# Patient Record
Sex: FEMALE | Race: WHITE | NOT HISPANIC OR LATINO | Employment: UNEMPLOYED
[De-identification: names, ages, dates, MRNs, and addresses within clinical notes are randomized per-mention and may not be internally consistent; named-entity substitution may affect disease eponyms.]

---

## 2021-10-25 ENCOUNTER — OFFICE VISIT (OUTPATIENT)
Dept: MEDICAL GROUP | Facility: CLINIC | Age: 46
End: 2021-10-25
Payer: MEDICAID

## 2021-10-25 VITALS
BODY MASS INDEX: 28.29 KG/M2 | SYSTOLIC BLOOD PRESSURE: 110 MMHG | RESPIRATION RATE: 12 BRPM | HEIGHT: 69 IN | DIASTOLIC BLOOD PRESSURE: 68 MMHG | WEIGHT: 191 LBS | HEART RATE: 78 BPM

## 2021-10-25 DIAGNOSIS — Z12.11 SCREENING FOR COLON CANCER: ICD-10-CM

## 2021-10-25 DIAGNOSIS — F51.01 PRIMARY INSOMNIA: ICD-10-CM

## 2021-10-25 DIAGNOSIS — Z13.9 ENCOUNTER FOR HEALTH-RELATED SCREENING: ICD-10-CM

## 2021-10-25 DIAGNOSIS — R00.2 PALPITATIONS: ICD-10-CM

## 2021-10-25 PROBLEM — J45.909 ASTHMA: Status: RESOLVED | Noted: 2021-10-25 | Resolved: 2021-10-25

## 2021-10-25 PROBLEM — R60.0 EDEMA OF LOWER EXTREMITY: Status: ACTIVE | Noted: 2017-04-20

## 2021-10-25 PROBLEM — J45.909 ASTHMA: Status: ACTIVE | Noted: 2021-10-25

## 2021-10-25 PROBLEM — G47.00 INSOMNIA: Status: ACTIVE | Noted: 2017-05-16

## 2021-10-25 PROCEDURE — 99214 OFFICE O/P EST MOD 30 MIN: CPT | Mod: GC | Performed by: STUDENT IN AN ORGANIZED HEALTH CARE EDUCATION/TRAINING PROGRAM

## 2021-10-25 RX ORDER — ZOLPIDEM TARTRATE 10 MG/1
10 TABLET ORAL
Qty: 90 TABLET | Refills: 0 | Status: SHIPPED | OUTPATIENT
Start: 2021-10-25 | End: 2022-01-23

## 2021-10-25 RX ORDER — METHOCARBAMOL 750 MG/1
1 TABLET, FILM COATED ORAL
COMMUNITY
Start: 2021-09-20 | End: 2021-11-02 | Stop reason: SDUPTHER

## 2021-10-25 RX ORDER — ZOLPIDEM TARTRATE 10 MG/1
1 TABLET ORAL
COMMUNITY
Start: 2021-09-21 | End: 2021-10-25 | Stop reason: SDUPTHER

## 2021-10-26 NOTE — PROGRESS NOTES
"[facility]    Chief Complaint   Patient presents with   • Medication Refill     ambien, robaxine celine       HISTORY OF PRESENT ILLNESS: Patient is a 46 y.o. female established patient who presents today to discuss the medical issues below:    Problem   Screening for Colon Cancer    Patient has not had previous colon cancer screening.  Denies abdominal complaints or hematochezia.  Denies family history of colon cancer.     Palpitations    Patient reports multiple episodes of palpitations and mild chest discomfort over the past few weeks.  Patient states that she will go from \"tachycardia to bradycardia\" in a matter of moments.  She has not complained of dizziness, nausea, crushing chest pain, dyspnea, or difficulty breathing during these episodes.    Of note, patient states that she was hospitalized a few years ago was apparently diagnosed with CHF and placed on metoprolol.  This was discontinued at some point, and she has not seen a cardiologist in many years.       Encounter for Health-Related Screening    Patient states that she has not had routine labs done in many years now.  She states that she would like to repeat labs for health maintenance screening.     Insomnia    Patient reports over 15 years of chronic insomnia, states that she has been on Ambien for the past 15 years with adequate relief of symptoms.  Patient states that she has tried multiple other regimens in the past, including melatonin, trazodone, and Modification of sleep hygiene.  She also also trialed lower doses of Ambien in the past without adequate relief.  She is not interested in CBT at this time.     Edema of Lower Extremity   Asthma (Resolved)        Patient Active Problem List    Diagnosis Date Noted   • Asthma 10/25/2021   • Screening for colon cancer 10/25/2021   • Palpitations 10/25/2021   • Insomnia 05/16/2017   • Edema of lower extremity 04/20/2017       Allergies:Patient has no allergy information on record.    Current " "Outpatient Medications   Medication Sig Dispense Refill   • methocarbamol (ROBAXIN) 750 MG Tab Take 1 Tablet by mouth 2 times a day.     • zolpidem (AMBIEN) 10 MG Tab Take 1 Tablet by mouth at bedtime as needed for up to 90 days. 90 Tablet 0     No current facility-administered medications for this visit.         No past medical history on file.    Social History     Tobacco Use   • Smoking status: Not on file   Substance Use Topics   • Alcohol use: Not on file   • Drug use: Not on file       No family status information on file.   No family history on file.    ROS:  Negative except as stated above.      Exam:   /68   Pulse 78   Resp 12   Ht 1.753 m (5' 9\")   Wt 86.6 kg (191 lb)  Body mass index is 28.21 kg/m².  General:  Well nourished, well developed female in NAD.  HENT: Normocephalic  Pulmonary: Clear to ausculation.  Normal effort. No rales, rhonchi, or wheezing.  Cardiovascular: Regular rate and rhythm without murmur.   Abdomen: Normal bowel sounds, soft and nontender, no palpable liver, spleen, or masses.  Extremities: No LE edema noted. 5/5 strength in all extremities  Neuro: Grossly nonfocal.  Psych: Alert and oriented to person, place, and time. Appropriate mood and conversation.            Assessment/Plan:    Insomnia  Per patient report, she is doing well on her current regimen and performing adequate sleep hygiene.  Discussed with patient that she may need to decrease in the near future in order to avoid complications of chronic use, and she agreed to this.  -Refilled for 3 months  -Return to clinic in 1 month to reassess and discuss possible decrease.    Palpitations  EKG performed in clinic reassuring.  -Unclear cardiac history, requested the patient sign a release of information form for [facility] so that we may further identify the possible history of CHF and/or other cardiac history.  -Consider echocardiogram after review of records  -Consider Zio patch after review " of records    Screening for colon cancer  Based on current USPSTF guidelines, patient is now at the appropriate age for colon cancer screening.  She opts for colonoscopy at this time.    Encounter for health-related screening  Ordered BMP, CBC, A1c, lipid profile, and TSH.  -Return to clinic in about 1 month to review results and make changes as needed.             Tj Long, DO  [facility]  PGY-2

## 2021-10-26 NOTE — ASSESSMENT & PLAN NOTE
EKG performed in clinic reassuring.  -Unclear cardiac history, requested the patient sign a release of information form for [facility] so that we may further identify the possible history of CHF and/or other cardiac history.  -Consider echocardiogram after review of records  -Consider Zio patch after review of records

## 2021-10-26 NOTE — ASSESSMENT & PLAN NOTE
Ordered BMP, CBC, A1c, lipid profile, and TSH.  -Return to clinic in about 1 month to review results and make changes as needed.

## 2021-10-26 NOTE — ASSESSMENT & PLAN NOTE
Per patient report, she is doing well on her current regimen and performing adequate sleep hygiene.  Discussed with patient that she may need to decrease in the near future in order to avoid complications of chronic use, and she agreed to this.  -Refilled for 3 months  -Return to clinic in 1 month to reassess and discuss possible decrease.

## 2021-10-26 NOTE — ASSESSMENT & PLAN NOTE
Based on current USPSTF guidelines, patient is now at the appropriate age for colon cancer screening.  She opts for colonoscopy at this time.

## 2021-11-02 RX ORDER — METHOCARBAMOL 750 MG/1
750 TABLET, FILM COATED ORAL
Qty: 120 TABLET | Refills: 0 | Status: SHIPPED | OUTPATIENT
Start: 2021-11-02 | End: 2022-03-09 | Stop reason: SDUPTHER

## 2021-11-02 NOTE — TELEPHONE ENCOUNTER
Received request via: Patient    Was the patient seen in the last year in this department? Yes    Does the patient have an active prescription (recently filled or refills available) for medication(s) requested? No     Patients called and asking about the refills.Accoring to CB her we send this meds on 09/20/2021 for a 90 days supply.

## 2022-02-14 ENCOUNTER — TELEPHONE (OUTPATIENT)
Dept: MEDICAL GROUP | Facility: CLINIC | Age: 47
End: 2022-02-14
Payer: MEDICAID

## 2022-02-14 NOTE — TELEPHONE ENCOUNTER
Caller Name: Lashon Ricketts  Call Back Number: [phone] (home)       Patient called with concerns about her medications. She states that she has been taking her robaxin 3x daily.. and it is written as 2x daily. She also states she is taking Ambien 10mg, nightly. I do not see that medication on her list. I did let her know she will need to be seen about the Ambien and we probably will not be able to have that prescribed to her prior to her appointment, she has an appointment in March..

## 2022-03-01 ENCOUNTER — APPOINTMENT (OUTPATIENT)
Dept: MEDICAL GROUP | Facility: CLINIC | Age: 47
End: 2022-03-01
Payer: MEDICAID

## 2022-03-09 ENCOUNTER — OFFICE VISIT (OUTPATIENT)
Dept: MEDICAL GROUP | Facility: CLINIC | Age: 47
End: 2022-03-09
Payer: MEDICAID

## 2022-03-09 VITALS
OXYGEN SATURATION: 95 % | HEIGHT: 69 IN | BODY MASS INDEX: 25.82 KG/M2 | WEIGHT: 174.3 LBS | HEART RATE: 85 BPM | RESPIRATION RATE: 16 BRPM | DIASTOLIC BLOOD PRESSURE: 82 MMHG | SYSTOLIC BLOOD PRESSURE: 134 MMHG | TEMPERATURE: 97.7 F

## 2022-03-09 DIAGNOSIS — M54.42 CHRONIC BILATERAL LOW BACK PAIN WITH BILATERAL SCIATICA: ICD-10-CM

## 2022-03-09 DIAGNOSIS — G89.29 CHRONIC BILATERAL LOW BACK PAIN WITH BILATERAL SCIATICA: ICD-10-CM

## 2022-03-09 DIAGNOSIS — M54.31 BILATERAL SCIATICA: ICD-10-CM

## 2022-03-09 DIAGNOSIS — M54.41 CHRONIC BILATERAL LOW BACK PAIN WITH BILATERAL SCIATICA: ICD-10-CM

## 2022-03-09 DIAGNOSIS — M54.32 BILATERAL SCIATICA: ICD-10-CM

## 2022-03-09 PROBLEM — M54.9 BACK PAIN: Status: ACTIVE | Noted: 2017-04-20

## 2022-03-09 PROBLEM — M54.30 SCIATICA: Status: ACTIVE | Noted: 2022-03-09

## 2022-03-09 PROCEDURE — 99213 OFFICE O/P EST LOW 20 MIN: CPT | Mod: GE

## 2022-03-09 RX ORDER — ZOLPIDEM TARTRATE 10 MG/1
10 TABLET ORAL
COMMUNITY
Start: 2022-02-17 | End: 2022-04-12 | Stop reason: SDUPTHER

## 2022-03-09 RX ORDER — METHOCARBAMOL 750 MG/1
750 TABLET, FILM COATED ORAL 3 TIMES DAILY
Qty: 90 TABLET | Refills: 3 | Status: SHIPPED | OUTPATIENT
Start: 2022-03-09 | End: 2022-06-22 | Stop reason: SDUPTHER

## 2022-03-09 ASSESSMENT — PATIENT HEALTH QUESTIONNAIRE - PHQ9: CLINICAL INTERPRETATION OF PHQ2 SCORE: 0

## 2022-03-10 NOTE — PROGRESS NOTES
[facility]     PATIENT ID:  NAME:  Lashon Ricketts  MRN:               [id number]  YOB: 1975    Date: 2:43 PM      Resident: Domo Constantino MD     CC:  Chronic back pain    HPI: Lashon Ricketts is a 46 y.o. female who presented to clinic stating that since her medication frequency was changed she has developed worsening chronic back pain.  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  She states that since that time she has had multiple imaging studies that it showed chronic degenerative changes and she has experienced chronic lower back pain and sciatica.  Patient states that for the past 19 years she has been taking Robaxin 750 mg usually 3 times per day.  She states that she has been seen at the [facility] for a long time and occasionally when her physicians graduate and she is assigned a new physician her medications will be changed and she develops worsening of her chronic low back pain.  She denies any recent falls or trauma.  She reports no additional complaints or concerns at this time.    REVIEW OF SYSTEMS:   Ten systems reviewed and were negative except as noted in the HPI.                PROBLEM LIST  Patient Active Problem List   Diagnosis   • Insomnia   • Edema of lower extremity   • Screening for colon cancer   • Palpitations   • Encounter for health-related screening   • Chronic back pain   • Sciatica        PAST SURGICAL HISTORY:  Past Surgical History:   Procedure Laterality Date   • GASTRIC BYPASS LAPAROSCOPIC  10/12/2000       FAMILY HISTORY:  History reviewed. No pertinent family history.    SOCIAL HISTORY:   Social History     Tobacco Use   • Smoking status: Not on file   • Smokeless tobacco: Not on file   Substance Use Topics   • Alcohol use: Not on file       ALLERGIES:  No Known Allergies    OUTPATIENT MEDICATIONS:    Current Outpatient Medications:   •  " zolpidem (AMBIEN) 10 MG Tab, Take 10 mg by mouth at bedtime as needed., Disp: , Rfl:   •  methocarbamol (ROBAXIN) 750 MG Tab, Take 1 Tablet by mouth 3 times a day for 120 days., Disp: 90 Tablet, Rfl: 3    PHYSICAL EXAM:  Vitals:    03/09/22 1552   BP: 134/82   BP Location: Right arm   Patient Position: Sitting   BP Cuff Size: Adult   Pulse: 85   Resp: 16   Temp: 36.5 °C (97.7 °F)   TempSrc: Temporal   SpO2: 95%   Weight: 79.1 kg (174 lb 4.8 oz)   Height: 1.753 m (5' 9\")       General: Pt resting in NAD, pleasant and cooperative   Skin:  Pink, warm and dry.  HEENT: NC/AT. EOMI. PERRLA, neck supple, no lymphadenopathy  Lungs:  Symmetrical.  CTAB, good air movement, no respiratory distress  Cardiovascular:  S1/S2 RRR, no murmurs rubs or gallops, warm and well-perfused  Abdomen:  Abdomen is soft, nontender  Extremities:  Full range of motion.  Back: Mild spasm to the left lower paraspinal muscles with mild tenderness to palpation, no midline tenderness or spinous process tenderness, no step-off  CNS:  Muscle tone is normal. No gross focal neurologic deficits      ASSESSMENT/PLAN:   46 y.o. female who presents to the clinic complaining of worsening chronic back pain since her Robaxin 750 mg prescription was reduced from 3 times daily to 2 times daily.  Have advised the patient that this is not a great long-term medication for pain control.  Advised her that it will be necessary to decrease this medication in the future but at this time I will restart the Robaxin 750 mg at 3 times daily but will also encourage that the patient participates in physical therapy until she is able to be seen by spinal surgery for surgical options. The patient states she has been seen by spinal surgery in the past and has been advised that surgery would be an option for her, she states that she would like to pursue this but is concerned about possible adverse effects from the surgery. Will have the patient return to the clinic after a month " of physical therapy to evaluate for any improvement and discuss if robaxin medication can be titrated down.     Problem List Items Addressed This Visit     Chronic back pain    Relevant Medications    methocarbamol (ROBAXIN) 750 MG Tab    Other Relevant Orders    Referral to Physical Therapy    Referral to Spine Surgery    Sciatica    Relevant Medications    zolpidem (AMBIEN) 10 MG Tab    methocarbamol (ROBAXIN) 750 MG Tab    Other Relevant Orders    Referral to Physical Therapy    Referral to Spine Surgery          Domo Constantino MD   PGY-1  [facility]

## 2022-04-08 ENCOUNTER — TELEPHONE (OUTPATIENT)
Dept: MEDICAL GROUP | Facility: CLINIC | Age: 47
End: 2022-04-08
Payer: MEDICAID

## 2022-04-08 NOTE — TELEPHONE ENCOUNTER
Phone Number Called: [phone] (home)     Call outcome: Left detailed message for patient. Informed to call back with any additional questions.    Message: Called patient and left a voicemail letting her know to schedule an appointment with a provider if she wants her controlled substance (zolpidem) refilled.

## 2022-04-11 DIAGNOSIS — G47.00 INSOMNIA, UNSPECIFIED TYPE: ICD-10-CM

## 2022-04-11 NOTE — TELEPHONE ENCOUNTER
Lashon came to the office today and was asking for her Ambien. She said you were supposed to send it in. It looks like it was never e-prescribed.

## 2022-04-12 DIAGNOSIS — G47.00 INSOMNIA, UNSPECIFIED TYPE: ICD-10-CM

## 2022-04-12 RX ORDER — ZOLPIDEM TARTRATE 10 MG/1
10 TABLET ORAL
Qty: 30 TABLET | Refills: 3 | Status: CANCELLED | OUTPATIENT
Start: 2022-04-12 | End: 2022-05-12

## 2022-04-12 RX ORDER — ZOLPIDEM TARTRATE 10 MG/1
10 TABLET ORAL
Qty: 30 TABLET | Refills: 1 | Status: SHIPPED | OUTPATIENT
Start: 2022-04-12 | End: 2022-06-22 | Stop reason: SDUPTHER

## 2022-04-12 RX ORDER — ZOLPIDEM TARTRATE 10 MG/1
10 TABLET ORAL
Qty: 30 TABLET | Status: CANCELLED | OUTPATIENT
Start: 2022-04-12

## 2022-04-12 NOTE — TELEPHONE ENCOUNTER
Patient called to follow-up on this. She says she was supposed to get her Ambien refilled at her last appointment but it was never sent in. Confirmed pharmacy on file, thank you!

## 2022-06-14 ENCOUNTER — APPOINTMENT (OUTPATIENT)
Dept: PHYSICAL THERAPY | Facility: REHABILITATION | Age: 47
End: 2022-06-14
Payer: MEDICAID

## 2022-06-16 ENCOUNTER — APPOINTMENT (OUTPATIENT)
Dept: PHYSICAL THERAPY | Facility: REHABILITATION | Age: 47
End: 2022-06-16
Payer: MEDICAID

## 2022-06-21 ENCOUNTER — APPOINTMENT (OUTPATIENT)
Dept: PHYSICAL THERAPY | Facility: REHABILITATION | Age: 47
End: 2022-06-21
Payer: MEDICAID

## 2022-06-22 ENCOUNTER — OFFICE VISIT (OUTPATIENT)
Dept: MEDICAL GROUP | Facility: CLINIC | Age: 47
End: 2022-06-22
Payer: MEDICAID

## 2022-06-22 VITALS
DIASTOLIC BLOOD PRESSURE: 82 MMHG | RESPIRATION RATE: 16 BRPM | WEIGHT: 159 LBS | SYSTOLIC BLOOD PRESSURE: 124 MMHG | OXYGEN SATURATION: 97 % | HEIGHT: 69 IN | HEART RATE: 77 BPM | BODY MASS INDEX: 23.55 KG/M2

## 2022-06-22 DIAGNOSIS — G89.29 CHRONIC BILATERAL LOW BACK PAIN WITHOUT SCIATICA: ICD-10-CM

## 2022-06-22 DIAGNOSIS — M54.50 CHRONIC BILATERAL LOW BACK PAIN WITHOUT SCIATICA: ICD-10-CM

## 2022-06-22 DIAGNOSIS — G47.00 INSOMNIA, UNSPECIFIED TYPE: ICD-10-CM

## 2022-06-22 PROCEDURE — 99213 OFFICE O/P EST LOW 20 MIN: CPT | Mod: GE

## 2022-06-22 RX ORDER — ZOLPIDEM TARTRATE 10 MG/1
10 TABLET ORAL
Qty: 30 TABLET | Refills: 2 | Status: SHIPPED | OUTPATIENT
Start: 2022-06-22 | End: 2022-07-22

## 2022-06-22 RX ORDER — METHOCARBAMOL 750 MG/1
750 TABLET, FILM COATED ORAL 3 TIMES DAILY
Qty: 90 TABLET | Refills: 3 | Status: SHIPPED | OUTPATIENT
Start: 2022-06-22 | End: 2022-10-20

## 2022-06-22 NOTE — PROGRESS NOTES
[facility]     PATIENT ID:  NAME:  Lashon Ricketts  MRN:               [id number]  YOB: 1975    Date: 2:22 PM      Resident: Domo Constantino MD     CC: Med refill     HPI: Lashon Ricketts is a 46 y.o. female who presents to clinic for medication refill.  Patient has a longstanding history of chronic lower back pain.  She states that she has not been able to get in with physical therapy as of this time but does have an appointment in the near future.  Patient also states that she has been in contact with the spinal surgical clinic and is planning to make an appointment there soon as she feels she is ready to broach the subject of surgery at this time.  Patient has been medicating daily with Robaxin 3 times daily and states that her pain is well controlled.  She also notes that she has been taking Ambien nightly and this is necessary for her sleep.  She denies any other complaints or concerns at this time and states that she was in her normal state of health.  She denies any recent trauma or falls.      REVIEW OF SYSTEMS:   Ten systems reviewed and were negative except as noted in the HPI.                PROBLEM LIST  Patient Active Problem List   Diagnosis   • Insomnia   • Edema of lower extremity   • Screening for colon cancer   • Palpitations   • Encounter for health-related screening   • Chronic back pain   • Sciatica        PAST SURGICAL HISTORY:  Past Surgical History:   Procedure Laterality Date   • GASTRIC BYPASS LAPAROSCOPIC  10/12/2000       FAMILY HISTORY:  History reviewed. No pertinent family history.    SOCIAL HISTORY:   Social History     Tobacco Use   • Smoking status: Never Smoker   • Smokeless tobacco: Never Used   Substance Use Topics   • Alcohol use: Not on file       ALLERGIES:  No Known Allergies    OUTPATIENT MEDICATIONS:    Current Outpatient Medications:   •  methocarbamol (ROBAXIN) 750 MG Tab, Take 1 Tablet by mouth 3 times a day for 120 days., Disp: 90 Tablet, Rfl:  "3  •  zolpidem (AMBIEN) 10 MG Tab, Take 1 Tablet by mouth at bedtime as needed for Sleep for up to 30 days., Disp: 30 Tablet, Rfl: 2    PHYSICAL EXAM:  Vitals:    06/22/22 0811   BP: 124/82   BP Location: Left arm   Patient Position: Sitting   BP Cuff Size: Adult   Pulse: 77   Resp: 16   SpO2: 97%   Weight: 72.1 kg (159 lb)   Height: 1.753 m (5' 9\")       General: Pt resting in NAD, pleasant and cooperative   Skin:  Pink, warm and dry.  HEENT: NC/AT. EOMI. neck supple  Lungs:  Symmetrical.  CTAB, good air movement, no respiratory distress  Cardiovascular:  S1/S2 RRR, no murmurs rubs or gallops, warm and well-perfused   Abdomen:  Abdomen is soft, nontender  Back: No midline tenderness, no step-offs, diffuse paraspinal lumbar tenderness to palpation  Extremities:  Full range of motion.  CNS:  Muscle tone is normal. No gross focal neurologic deficits      ASSESSMENT/PLAN:   46 y.o. female who presents to the clinic for medication refill.  Patient states that Ozzy has been working well for her sleep and she has been taking it nightly without any adverse effects or concerns.  Patient states she has been waking up well rested and has not had periods of sleepiness throughout her mornings.  Patient also states she needs refill of Robaxin which she has been taking for multiple years.  She states that this is the only medication that is helpful for her chronic low back pain.  I will refill these medications today and will have the patient return to clinic after she is able to start a physical therapy regimen and has been able to speak to a spinal surgical clinic to help her with future therapy planning.    Problem List Items Addressed This Visit     Insomnia    Relevant Medications    zolpidem (AMBIEN) 10 MG Tab    Chronic back pain    Relevant Medications    methocarbamol (ROBAXIN) 750 MG Tab    Other Relevant Orders    Referral to Pain Clinic          Domo Constantino MD   PGY-1  [facility]     "

## 2022-06-23 ENCOUNTER — APPOINTMENT (OUTPATIENT)
Dept: PHYSICAL THERAPY | Facility: REHABILITATION | Age: 47
End: 2022-06-23
Payer: MEDICAID

## 2022-06-28 ENCOUNTER — APPOINTMENT (OUTPATIENT)
Dept: PHYSICAL THERAPY | Facility: REHABILITATION | Age: 47
End: 2022-06-28
Payer: MEDICAID

## 2022-06-30 ENCOUNTER — APPOINTMENT (OUTPATIENT)
Dept: PHYSICAL THERAPY | Facility: REHABILITATION | Age: 47
End: 2022-06-30
Payer: MEDICAID

## 2022-07-05 ENCOUNTER — APPOINTMENT (OUTPATIENT)
Dept: PHYSICAL THERAPY | Facility: REHABILITATION | Age: 47
End: 2022-07-05
Payer: MEDICAID